# Patient Record
Sex: FEMALE | Race: WHITE | NOT HISPANIC OR LATINO | URBAN - METROPOLITAN AREA
[De-identification: names, ages, dates, MRNs, and addresses within clinical notes are randomized per-mention and may not be internally consistent; named-entity substitution may affect disease eponyms.]

---

## 2024-05-25 ENCOUNTER — OFFICE VISIT (OUTPATIENT)
Dept: URGENT CARE | Facility: CLINIC | Age: 24
End: 2024-05-25
Payer: COMMERCIAL

## 2024-05-25 VITALS
OXYGEN SATURATION: 98 % | BODY MASS INDEX: 22.16 KG/M2 | DIASTOLIC BLOOD PRESSURE: 74 MMHG | HEART RATE: 74 BPM | RESPIRATION RATE: 16 BRPM | TEMPERATURE: 98 F | HEIGHT: 67 IN | WEIGHT: 141.19 LBS | SYSTOLIC BLOOD PRESSURE: 112 MMHG

## 2024-05-25 DIAGNOSIS — N89.8 VAGINAL DISCHARGE: ICD-10-CM

## 2024-05-25 DIAGNOSIS — N76.0 ACUTE VAGINITIS: Primary | ICD-10-CM

## 2024-05-25 LAB
B-HCG UR QL: NEGATIVE
BILIRUB UR QL STRIP: NEGATIVE
COLOR UR: ABNORMAL
CTP QC/QA: YES
GLUCOSE UR QL STRIP: NEGATIVE
KETONES UR QL STRIP: NEGATIVE
LEUKOCYTE ESTERASE UR QL STRIP: POSITIVE
PH, POC UA: 6.5
POC BLOOD, URINE: NEGATIVE
POC NITRATES, URINE: NEGATIVE
PROT UR QL STRIP: NEGATIVE
SP GR UR STRIP: 1.01 (ref 1–1.03)
UROBILINOGEN UR STRIP-ACNC: NORMAL (ref 0.1–1.1)

## 2024-05-25 PROCEDURE — 81514 NFCT DS BV&VAGINITIS DNA ALG: CPT

## 2024-05-25 PROCEDURE — 99203 OFFICE O/P NEW LOW 30 MIN: CPT | Mod: S$GLB,,,

## 2024-05-25 PROCEDURE — 81025 URINE PREGNANCY TEST: CPT | Mod: S$GLB,,,

## 2024-05-25 PROCEDURE — 81003 URINALYSIS AUTO W/O SCOPE: CPT | Mod: QW,S$GLB,,

## 2024-05-25 RX ORDER — FLUCONAZOLE 150 MG/1
150 TABLET ORAL DAILY
Qty: 1 TABLET | Refills: 0 | Status: SHIPPED | OUTPATIENT
Start: 2024-05-25 | End: 2024-05-26

## 2024-05-25 RX ORDER — MINOCYCLINE HYDROCHLORIDE 100 MG/1
100 TABLET ORAL EVERY 12 HOURS
COMMUNITY
Start: 2024-04-16

## 2024-05-25 RX ORDER — SPIRONOLACTONE 50 MG/1
50 TABLET, FILM COATED ORAL DAILY
COMMUNITY
Start: 2024-04-16

## 2024-05-25 RX ORDER — SERTRALINE HYDROCHLORIDE 100 MG/1
100 TABLET, FILM COATED ORAL DAILY
COMMUNITY
Start: 2024-05-11

## 2024-05-25 NOTE — PROGRESS NOTES
"Subjective:      Patient ID: Pretty Bhatt is a 23 y.o. female.    Vitals:  height is 5' 6.58" (1.691 m) and weight is 64 kg (141 lb 3.3 oz). Her oral temperature is 97.8 °F (36.6 °C). Her blood pressure is 112/74 and her pulse is 74. Her respiration is 16 and oxygen saturation is 98%.     Chief Complaint: Dysuria    Pretty Bhatt is a 23 y.o. female who presents for vaginal irritation which onset 2 days ago. Associated sxs include vaginal discharge and vaginal itching. She notes an increase in clear discharge with no odor. Patient denies any fever, chills, abd pain, flank pain, dysuria, hematuria, and decreased urination. Patient does have a hx of UTIs. No treatments tried. Patient notes hx of yeast infections with similar symptoms.    Dysuria   This is a new problem. The current episode started in the past 7 days. The problem occurs intermittently. The problem has been unchanged. The quality of the pain is described as burning. The pain is at a severity of 3/10. The pain is mild. There has been no fever. She is Not sexually active. Associated symptoms include a discharge, frequency, hesitancy and urgency. Pertinent negatives include no chills, flank pain, hematuria, nausea, vomiting or rash. She has tried nothing for the symptoms. The treatment provided no relief.       Constitution: Negative for chills, fatigue and fever.   Gastrointestinal:  Negative for abdominal pain, nausea, vomiting and diarrhea.   Genitourinary:  Positive for dysuria, frequency and urgency. Negative for urine decreased, flank pain, bladder incontinence, hematuria, history of kidney stones, vaginal discharge and genital sore.   Skin:  Negative for rash.   Neurological:  Negative for numbness and tingling.      Objective:     Physical Exam   Constitutional: She is oriented to person, place, and time. She appears well-developed.   HENT:   Head: Normocephalic and atraumatic.   Ears:   Right Ear: External ear normal.   Left Ear: External ear " normal.   Nose: Nose normal.   Mouth/Throat: Mucous membranes are normal.   Eyes: Conjunctivae and lids are normal.   Neck: Trachea normal. Neck supple.   Cardiovascular: Normal rate, regular rhythm and normal heart sounds.   Pulmonary/Chest: Effort normal and breath sounds normal. No respiratory distress.   Abdominal: Normal appearance and bowel sounds are normal. She exhibits no distension and no mass. Soft. There is no abdominal tenderness.   Genitourinary:         Comments: Deferred.     Musculoskeletal: Normal range of motion.         General: Normal range of motion.   Neurological: She is alert and oriented to person, place, and time. She has normal strength.   Skin: Skin is warm, dry, intact, not diaphoretic and not pale.   Psychiatric: Her speech is normal and behavior is normal. Judgment and thought content normal.   Nursing note and vitals reviewed.      Assessment:     1. Acute vaginitis    2. Vaginal discharge        Results for orders placed or performed in visit on 05/25/24   POCT Urinalysis, Dipstick, Automated, W/O Scope   Result Value Ref Range    POC Blood, Urine Negative Negative    POC Bilirubin, Urine Negative Negative    POC Urobilinogen, Urine normal 0.1 - 1.1    POC Ketones, Urine Negative Negative    POC Protein, Urine Negative Negative    POC Nitrates, Urine Negative Negative    POC Glucose, Urine Negative Negative    pH, UA 6.5     POC Specific Gravity, Urine 1.015 1.003 - 1.029    POC Leukocytes, Urine Positive (A) Negative    Color, UA Light Yellow Light Yellow, Yellow   POCT urine pregnancy   Result Value Ref Range    POC Preg Test, Ur Negative Negative     Acceptable Yes        Plan:       Acute vaginitis  -     POCT Urinalysis, Dipstick, Automated, W/O Scope  -     fluconazole (DIFLUCAN) 150 MG Tab; Take 1 tablet (150 mg total) by mouth once daily. for 1 day  Dispense: 1 tablet; Refill: 0  -     Vaginosis Screen by DNA Probe; Future; Expected date: 05/25/2024  -      POCT urine pregnancy    Vaginal discharge      Afebrile. VSS.  UA with (+) LE.   deferred.  Will presumptively tx for Candida due to history with similar symptoms but suspicion for BV.  Vaginosis screen ordered. Will notify patient with results in 3-5 days and adjust tx as necessary.  Meds: Candida sent to preferred pharmacy.  Discussed proper hygiene practices.  Increase fluid intake.  RTC for any worsening or no improvement of symptoms.

## 2024-05-29 ENCOUNTER — TELEPHONE (OUTPATIENT)
Dept: URGENT CARE | Facility: CLINIC | Age: 24
End: 2024-05-29
Payer: COMMERCIAL

## 2024-05-29 DIAGNOSIS — N76.0 BV (BACTERIAL VAGINOSIS): Primary | ICD-10-CM

## 2024-05-29 DIAGNOSIS — B96.89 BV (BACTERIAL VAGINOSIS): Primary | ICD-10-CM

## 2024-05-29 LAB
BACTERIAL VAGINOSIS DNA: POSITIVE
CANDIDA GLABRATA DNA: NEGATIVE
CANDIDA KRUSEI DNA: NEGATIVE
CANDIDA RRNA VAG QL PROBE: POSITIVE
T VAGINALIS RRNA GENITAL QL PROBE: NEGATIVE

## 2024-05-29 RX ORDER — METRONIDAZOLE 500 MG/1
500 TABLET ORAL EVERY 12 HOURS
Qty: 14 TABLET | Refills: 0 | Status: SHIPPED | OUTPATIENT
Start: 2024-05-29 | End: 2024-06-05

## 2024-05-29 NOTE — TELEPHONE ENCOUNTER
Called regarding (+) BV and Candida on vaginosis screen. No answer. LVM. MyChart message sent. Treated for Candida appropriately at the time of visit. Will send flagyl to preferred pharmacy on file.    Deisy Robles PA-C